# Patient Record
Sex: FEMALE | Race: ASIAN | NOT HISPANIC OR LATINO | URBAN - METROPOLITAN AREA
[De-identification: names, ages, dates, MRNs, and addresses within clinical notes are randomized per-mention and may not be internally consistent; named-entity substitution may affect disease eponyms.]

---

## 2024-08-20 ENCOUNTER — EMERGENCY (EMERGENCY)
Facility: HOSPITAL | Age: 24
LOS: 1 days | Discharge: HOME | End: 2024-08-20
Attending: EMERGENCY MEDICINE
Payer: COMMERCIAL

## 2024-08-20 VITALS
HEART RATE: 80 BPM | OXYGEN SATURATION: 100 % | WEIGHT: 70.11 LBS | DIASTOLIC BLOOD PRESSURE: 80 MMHG | HEIGHT: 59 IN | TEMPERATURE: 99 F | SYSTOLIC BLOOD PRESSURE: 117 MMHG | RESPIRATION RATE: 20 BRPM

## 2024-08-20 LAB
ALBUMIN SERPL ELPH-MCNC: 4.6 G/DL — SIGNIFICANT CHANGE UP (ref 3.3–5)
ALP SERPL-CCNC: 45 U/L — SIGNIFICANT CHANGE UP (ref 40–120)
ALT FLD-CCNC: 10 U/L — SIGNIFICANT CHANGE UP (ref 10–45)
ANION GAP SERPL CALC-SCNC: 13 MMOL/L — SIGNIFICANT CHANGE UP (ref 5–17)
AST SERPL-CCNC: 18 U/L — SIGNIFICANT CHANGE UP (ref 10–40)
BASOPHILS # BLD AUTO: 0.04 K/UL — SIGNIFICANT CHANGE UP (ref 0–0.2)
BASOPHILS NFR BLD AUTO: 0.6 % — SIGNIFICANT CHANGE UP (ref 0–2)
BILIRUB SERPL-MCNC: 0.3 MG/DL — SIGNIFICANT CHANGE UP (ref 0.2–1.2)
BUN SERPL-MCNC: 8 MG/DL — SIGNIFICANT CHANGE UP (ref 7–23)
CALCIUM SERPL-MCNC: 9.4 MG/DL — SIGNIFICANT CHANGE UP (ref 8.4–10.5)
CHLORIDE SERPL-SCNC: 103 MMOL/L — SIGNIFICANT CHANGE UP (ref 96–108)
CO2 SERPL-SCNC: 23 MMOL/L — SIGNIFICANT CHANGE UP (ref 22–31)
CREAT SERPL-MCNC: 0.64 MG/DL — SIGNIFICANT CHANGE UP (ref 0.5–1.3)
EGFR: 126 ML/MIN/1.73M2 — SIGNIFICANT CHANGE UP
EOSINOPHIL # BLD AUTO: 0.15 K/UL — SIGNIFICANT CHANGE UP (ref 0–0.5)
EOSINOPHIL NFR BLD AUTO: 2.3 % — SIGNIFICANT CHANGE UP (ref 0–6)
GLUCOSE SERPL-MCNC: 93 MG/DL — SIGNIFICANT CHANGE UP (ref 70–99)
HCT VFR BLD CALC: 38.7 % — SIGNIFICANT CHANGE UP (ref 34.5–45)
HGB BLD-MCNC: 12.8 G/DL — SIGNIFICANT CHANGE UP (ref 11.5–15.5)
IMM GRANULOCYTES NFR BLD AUTO: 0.5 % — SIGNIFICANT CHANGE UP (ref 0–0.9)
LYMPHOCYTES # BLD AUTO: 2.62 K/UL — SIGNIFICANT CHANGE UP (ref 1–3.3)
LYMPHOCYTES # BLD AUTO: 39.7 % — SIGNIFICANT CHANGE UP (ref 13–44)
MCHC RBC-ENTMCNC: 28.3 PG — SIGNIFICANT CHANGE UP (ref 27–34)
MCHC RBC-ENTMCNC: 33.1 GM/DL — SIGNIFICANT CHANGE UP (ref 32–36)
MCV RBC AUTO: 85.6 FL — SIGNIFICANT CHANGE UP (ref 80–100)
MONOCYTES # BLD AUTO: 0.42 K/UL — SIGNIFICANT CHANGE UP (ref 0–0.9)
MONOCYTES NFR BLD AUTO: 6.4 % — SIGNIFICANT CHANGE UP (ref 2–14)
NEUTROPHILS # BLD AUTO: 3.34 K/UL — SIGNIFICANT CHANGE UP (ref 1.8–7.4)
NEUTROPHILS NFR BLD AUTO: 50.5 % — SIGNIFICANT CHANGE UP (ref 43–77)
NRBC # BLD: 0 /100 WBCS — SIGNIFICANT CHANGE UP (ref 0–0)
PLATELET # BLD AUTO: 268 K/UL — SIGNIFICANT CHANGE UP (ref 150–400)
POTASSIUM SERPL-MCNC: 4.2 MMOL/L — SIGNIFICANT CHANGE UP (ref 3.5–5.3)
POTASSIUM SERPL-SCNC: 4.2 MMOL/L — SIGNIFICANT CHANGE UP (ref 3.5–5.3)
PROT SERPL-MCNC: 7.5 G/DL — SIGNIFICANT CHANGE UP (ref 6–8.3)
RBC # BLD: 4.52 M/UL — SIGNIFICANT CHANGE UP (ref 3.8–5.2)
RBC # FLD: 12.8 % — SIGNIFICANT CHANGE UP (ref 10.3–14.5)
SODIUM SERPL-SCNC: 139 MMOL/L — SIGNIFICANT CHANGE UP (ref 135–145)
WBC # BLD: 6.6 K/UL — SIGNIFICANT CHANGE UP (ref 3.8–10.5)
WBC # FLD AUTO: 6.6 K/UL — SIGNIFICANT CHANGE UP (ref 3.8–10.5)

## 2024-08-20 PROCEDURE — 70450 CT HEAD/BRAIN W/O DYE: CPT | Mod: 26,MC

## 2024-08-20 PROCEDURE — 99285 EMERGENCY DEPT VISIT HI MDM: CPT

## 2024-08-20 NOTE — ED PROVIDER NOTE - ATTENDING APP SHARED VISIT CONTRIBUTION OF CARE
Attending MD Harrison: I personally made/approved the management plan and take responsibility for the patient management.

## 2024-08-20 NOTE — ED PROVIDER NOTE - CLINICAL SUMMARY MEDICAL DECISION MAKING FREE TEXT BOX
Attending MD Harrison:  24-year-old woman with history of migraine headaches presents for evaluation of what started as vision changes in the right eye and left headache pain.  Her symptoms started on 8/15 when she noticed sudden onset of visual loss in the right eye in the lateral visual field 10 minutes later had onset of left-sided sharp headache pain with right-sided hand and face and leg numbness.  Symptoms did imtiaz, she still has intermittent head pain.  Her vision is back to normal she states though she has no symptoms in the face arm or leg anymore.  She was seen by her primary care physician who referred her to evaluation emergency department for rule out TIA.    Patient's vital signs are within normal limits she is sitting comfortably in the stretcher in no apparent distress.  Awake and alert. Affect appropriate. Oriented x 3.  Speech is fluent without dysarthria. Symmetric eyebrow raise, symmetric eyelid closure. PERRL b/l, EOMI b/l, symmetric smile, tongue midline.  5/5  strength bilaterally, 5/5 elbow flexion bilaterally, 5/5 elbow extension bilaterally, 5/5 shoulder shrug b/l.  5/5 plantar and dorsiflexion b/l, 5/5 knee flexion and extension b/l, 5/5 hip flexion and extension b/l. Sensation intact and symmetric grossly to light touch throughout face and bilateral upper and lower extremities,  Finger to nose normal bilaterally. Steady gait.    Patient presenting for transient vision loss right eye left sided headache pain and right-sided facial arm and leg numbness.  Symptoms resolved completely.  Still is intermittently having left-sided headache.  Patient's neurologic exam is without measurable deficit at this time.  Patient underwent noncontrast head CT in triage which was negative for acute pathology.  Overall this would be unlikely to be TIA given patient's age and lack of vascular risk factors and is more likely this is a complex migraine however will obtain the opinion of neurology          *The above represents an initial assessment/impression. Please refer to progress notes for potential changes in patient clinical course*

## 2024-08-20 NOTE — ED ADULT TRIAGE NOTE - CHIEF COMPLAINT QUOTE
Rt side facial numbness vision loss lt sided headache since Thursday symptoms resolved except headache

## 2024-08-20 NOTE — ED ADULT NURSE NOTE - CAS ELECT INFOMATION PROVIDED
opted not to stay for MRI, neuro exam unchanged, male visitor present for transport home/DC instructions

## 2024-08-20 NOTE — ED ADULT NURSE NOTE - NSFALLUNIVINTERV_ED_ALL_ED
Bed/Stretcher in lowest position, wheels locked, appropriate side rails in place/Call bell, personal items and telephone in reach/Instruct patient to call for assistance before getting out of bed/chair/stretcher/Non-slip footwear applied when patient is off stretcher/[place] to call system/Physically safe environment - no spills, clutter or unnecessary equipment/Purposeful proactive rounding/Room/bathroom lighting operational, light cord in reach

## 2024-08-20 NOTE — ED PROVIDER NOTE - OBJECTIVE STATEMENT
24-year-old female PMHx migraines, no daily meds presents to ED complaining of left-sided headache for the last 6 days.  Symptoms started on 8/15 while she was in [place], noticed sudden onset loss of vision in right eye in her lateral visual field, 10 minutes later noted sharp left-sided headache as well as right-sided facial numbness which progressed to right hand numbness and then right lower extremity numbness.  Patient took 1 Advil at that time and then eventually fell asleep still with the symptoms, woke up about 6 hours later the next morning with complete resolution of the right-sided symptoms of numbness and visual disturbance but persistence of the left-sided headache.  Since that day she has had persistent left-sided headache which she describes as "sharp and pinching", intermittent throughout the day w/ associated nausea. No obvious provoking or alleviating factors.  Has not taken anything for the pain since then. She is currently without the headache right now, last felt several hours ago in waiting room.  Went to PMD today and was sent into ED to "rule out TIA".  Sxs do not feel similar to any prior migraine. Denies current headache, visual changes, numbness/tingling, chest pain, shortness of breath, photophobia, vomiting, OCP/exogenous hormone usage, head trauma.

## 2024-08-20 NOTE — ED PROVIDER NOTE - RAPID ASSESSMENT
24-year-old female sent in from her primary office with concern for TIA.  Last Thursday so about 4 - 5 days ago patient had right visual disturbance associated with left-sided headache.  Visual disturbance has since resolved however headache remains.  Patient reports having migraines but never felt like this before in the past.  Light and loud noises made her symptoms worse nothing makes them better.  Patient was rapidly assessed via a telemedicine and/or role of Quick Triage Doctor; a limited history, physical exam and assessment was performed. The patient will be seen and further evaluated in the main emergency department. The remainder of care and evaluation will be conducted by the primary emergency medicine team. Receiving team will follow up on labs, imaging and serially reassess patient as indicated. All further decisions regarding patient care, evaluation and disposition are at the discretion of the receiving primary emergency department team.

## 2024-08-20 NOTE — ED ADULT NURSE NOTE - OBJECTIVE STATEMENT
24yoF PMH Breast cysts presents to ED with headache. Patient was travelling in [place] past 1 week. On Thursday, patient began suddenly having Left sided headache described as "feels like swelling, pressure" that advanced to R-side vision loss, then slurring speech, R hand weakness/paralysis and difficulty walking. After 1 hour of symptoms, patient took advil and fell asleep. Patient woke up 8 hours later asymptomatic. Patient has continued to have intermittent headaches. On evaluation, headache no longer present, occurred as recently as on the way to ED. Patient unable to identify triggers of headaches. 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 Patient had 4 hour flight to [place] each way, normally sedentary at work. At this time, patient unable to work due to discomfort looking at computer screens for extended time. Patient had had cysts removed on both breasts most recently 2017. Patient had another cyst found 3 months ago, no current plans to remove. Patient denies chest pain, SOB, N/V/D/C, fever, chills, urinary symptoms, falls, injuries, numbness, tingling.

## 2024-08-20 NOTE — ED PROVIDER NOTE - PROGRESS NOTE DETAILS
Attending MD Harrison:  Patient signed out to Dr. Rowe Pending results of neurology consultation recommendations.

## 2024-08-21 VITALS
HEART RATE: 92 BPM | RESPIRATION RATE: 18 BRPM | SYSTOLIC BLOOD PRESSURE: 103 MMHG | OXYGEN SATURATION: 100 % | DIASTOLIC BLOOD PRESSURE: 71 MMHG | TEMPERATURE: 98 F

## 2024-08-21 LAB — HCG SERPL-ACNC: <2 MIU/ML — SIGNIFICANT CHANGE UP

## 2024-08-21 PROCEDURE — 99285 EMERGENCY DEPT VISIT HI MDM: CPT | Mod: GC

## 2024-08-21 PROCEDURE — 85025 COMPLETE CBC W/AUTO DIFF WBC: CPT

## 2024-08-21 PROCEDURE — 99236 HOSP IP/OBS SAME DATE HI 85: CPT

## 2024-08-21 PROCEDURE — 80053 COMPREHEN METABOLIC PANEL: CPT

## 2024-08-21 PROCEDURE — 70496 CT ANGIOGRAPHY HEAD: CPT | Mod: MC

## 2024-08-21 PROCEDURE — 70498 CT ANGIOGRAPHY NECK: CPT | Mod: MC

## 2024-08-21 PROCEDURE — G0378: CPT

## 2024-08-21 PROCEDURE — 70450 CT HEAD/BRAIN W/O DYE: CPT | Mod: MC

## 2024-08-21 PROCEDURE — 93005 ELECTROCARDIOGRAM TRACING: CPT

## 2024-08-21 PROCEDURE — 84702 CHORIONIC GONADOTROPIN TEST: CPT

## 2024-08-21 PROCEDURE — 70498 CT ANGIOGRAPHY NECK: CPT | Mod: 26,MC

## 2024-08-21 PROCEDURE — 70496 CT ANGIOGRAPHY HEAD: CPT | Mod: 26,MC

## 2024-08-21 PROCEDURE — 99285 EMERGENCY DEPT VISIT HI MDM: CPT | Mod: 25

## 2024-08-21 NOTE — ED CDU PROVIDER DISPOSITION NOTE - PATIENT PORTAL LINK FT
You can access the FollowMyHealth Patient Portal offered by [facility] by registering at the following website: [email or web address]. By joining [facility]’s FollowMyHealth portal, you will also be able to view your health information using other applications (apps) compatible with our system.

## 2024-08-21 NOTE — ED CDU PROVIDER DISPOSITION NOTE - NSFOLLOWUPINSTRUCTIONS_ED_ALL_ED_FT
Headache    A headache is pain or discomfort felt around the head or neck area. The specific cause of a headache may not be found as there are many types including tension headaches, migraine headaches, and cluster headaches. Watch your condition for any changes. Things you can do to manage your pain include taking over the counter and prescription medications as instructed by your health care provider, lying down in a dark quiet room, limiting stress, getting regular sleep, and refraining from alcohol and tobacco products.    SEEK IMMEDIATE MEDICAL CARE IF YOU HAVE ANY OF THE FOLLOWING SYMPTOMS: fever, vomiting, stiff neck, loss of vision, problems with speech, muscle weakness, loss of balance, trouble walking, passing out, or confusion.    Neurology follow up   [address] [facility]  [phone]      James Benedict  Neurology  [address] [place] [address]  Phone: [phone]  Fax: [phone]

## 2024-08-21 NOTE — ED ADULT NURSE REASSESSMENT NOTE - NS ED NURSE REASSESS COMMENT FT1
Report received from MICHAEL HULL  Pt AAOx4, NAD, resp nonlabored, skin warm/dry, resting comfortably in bed with family . Pt denies headache, dizziness, chest pain, palpitations, SOB, abd pain, n/v/d, urinary symptoms, fevers, chills, weakness at this time. Pt awaiting for MRI  . Safety maintained with call bell within reach.
Report taken from SHILPA Moctezuma. Pt introduced to oncoming RN and updated on plan of care. A&Ox4, breathing unlabored, abd soft nontender nondistended, skin warm dry and intact, ambulatory independently. Call bell in reach, pt educated on use. Bed locked and in lowest position. Denies any other needs or complaints at this time.

## 2024-08-21 NOTE — CONSULT NOTE ADULT - SUBJECTIVE AND OBJECTIVE BOX
Neurology - Consult Note    -  Spectra: [phone] ([facility]), [phone] ([facility])  -    HPI: Patient MADELEINE ARANGO is a 24y (2000)  24 with PMH of migraine presenting for right sided lateral visual field loss on Thursday and then had right hand face and lower extremity numbness. Per history from patient: patient experienced sudden onset central scotoma Thursday (as if the sun in her central vision) this was associated with right sided peripheral vision loss. This resolved and patient subsequently experienced headache 10 minutes after affecting the left occipital area. She felt this headache as stabbing 10/10 in intensity. The headache was associated with nausea, photophobia and phonophobia. She then felt numbness at the left of her lips on the right and right side of her tongue. This progressively spread over the course of a minute to involve the right fingers all the way to the shoulder and down her body to her toes. She denies any precipitating factors or positional change of symptoms. She slept after taking tylenol and woke up the next day without symptoms except for a sensation that her head is swollen on the left side but this resolved over the course of a day and a half and was not bothersome. She visited her PCP and advised her to visit the ED. She denies infection or trauma. She has migraine headaches that happen once monthly and would be associated with nausea, photophobia and phonophobia but never as strong as this one or with aura. At this moment patient does not have any headache.    Review of Systems:    NEUROLOGICAL: +As stated in HPI above  All other review of systems is negative unless indicated above.    Allergies:  No Known Allergies  PMHx/PSHx/Family Hx: As above, otherwise see below     Social Hx:  No current use of tobacco, alcohol, or illicit drugs      Medications:  MEDICATIONS  (STANDING):    MEDICATIONS  (PRN):    Vitals:  T(C): 36.7 (08-21-24 @ 04:58), Max: 37 (08-20-24 @ 15:06)  HR: 73 (08-21-24 @ 04:58) (73 - 82)  BP: 117/84 (08-21-24 @ 04:58) (116/83 - 117/84)  RR: 16 (08-21-24 @ 04:58) (16 - 20)  SpO2: 99% (08-21-24 @ 04:58) (99% - 100%)    Physical Examination:  General - NAD    Neurologic Exam:  Mental status - Awake, Alert, Oriented to person, place, and time. Speech fluent, repetition and naming intact. Follows simple and complex commands. Attention/concentration, recent and remote memory (including registration and recall), and fund of knowledge intact    Cranial nerves - PERRLA, VFF, EOMI, face sensation (V1-V3) intact b/l, facial strength intact without asymmetry b/l, hearing intact b/l, palate with symmetric elevation, trapezius OR sternocleidomastoid 5/5 strength b/l, tongue midline on protrusion with full lateral movement    Motor - Normal bulk and tone throughout. No pronator drift.  Strength testing            Deltoid      Biceps      Triceps                 R            5                 5               5                     5                             L             5                 5               5                     5                                            Hip Flexion    Hip Extension    Knee Flexion    Knee Extension    Dorsiflexion    Plantar Flexion  R              5                           5                       5                           5                            5                          5  L              5                           5                        5                           5                            5                          5    Sensation - there is decreased sensation right UE and LE compared to left to pinprick and light touch    DTR's -             Biceps      Triceps     Brachioradialis      Patellar    Ankle    Toes/plantar response  R             2+             2+                  2+                       3+            2+                 Down  L              2+             2+                 2+                        3+           2+                 Down    Coordination - Finger to Nose intact b/l. No tremors appreciated. HTS intact bilaterally    Gait and station - Normal casual gait. Romberg (-)    Labs:                        12.8   6.60  )-----------( 268      ( 20 Aug 2024 15:39 )             38.7     08-20    139  |  103  |  8   ----------------------------<  93  4.2   |  23  |  0.64    Ca    9.4      20 Aug 2024 15:39    TPro  7.5  /  Alb  4.6  /  TBili  0.3  /  DBili  x   /  AST  18  /  ALT  10  /  AlkPhos  45  08-20    CAPILLARY BLOOD GLUCOSE        LIVER FUNCTIONS - ( 20 Aug 2024 15:39 )  Alb: 4.6 g/dL / Pro: 7.5 g/dL / ALK PHOS: 45 U/L / ALT: 10 U/L / AST: 18 U/L / GGT: x               CSF:                  Radiology:  CT Head No Cont:  (20 Aug 2024 19:19)    < from: CT Head No Cont (08.20.24 @ 19:19) >    IMPRESSION:    No acute intracranial bleeding.    < end of copied text >    
Neurology - Consult Note    -  Spectra: [phone] ([facility]), [phone] ([facility])  -    consult reason: R side numbness and R eye vision loss    HPI: Patient MADELEINE ARANGO is a 24y (2000) woman with a PMHx significant for ***      Review of Systems:  INCOMPLETE   CONSTITUTIONAL: No fevers or chills  EYES AND ENT: No visual changes or no throat pain   NECK: No pain or stiffness  RESPIRATORY: No hemoptysis or shortness of breath  CARDIOVASCULAR: No chest pain or palpitations  GASTROINTESTINAL: No melena or hematochezia  GENITOURINARY: No dysuria or hematuria  NEUROLOGICAL: +As stated in HPI above  SKIN: No itching, burning, rashes, or lesions   All other review of systems is negative unless indicated above.    Allergies:  No Known Allergies      PMHx/PSHx/Family Hx: As above, otherwise see below       Social Hx:  No current use of tobacco, alcohol, or illicit drugs  Lives with ***    Medications:  MEDICATIONS  (STANDING):    MEDICATIONS  (PRN):      Vitals:  T(C): 36.6 (08-21-24 @ 07:25), Max: 37 (08-20-24 @ 15:06)  HR: 73 (08-21-24 @ 07:25) (73 - 82)  BP: 96/61 (08-21-24 @ 07:25) (96/61 - 117/84)  RR: 16 (08-21-24 @ 07:25) (16 - 20)  SpO2: 99% (08-21-24 @ 07:25) (99% - 100%)    Physical Examination: INCOMPLETE  General - NAD  Cardiovascular - Peripheral pulses palpable, no edema  Eyes - Fundoscopy with flat, sharp optic discs and no hemorrhage or exudates; Fundoscopy not well visualized; Fundoscopy not performed due to safety precautions in the setting of the COVID-19 pandemic    Neurologic Exam:  Mental status - Awake, Alert, Oriented to person, place, and time. Speech fluent, repetition and naming intact. Follows simple and complex commands. Attention/concentration, recent and remote memory (including registration and recall), and fund of knowledge intact    Cranial nerves - PERRLA, VFF, EOMI, face sensation (V1-V3) intact b/l, facial strength intact without asymmetry b/l, hearing intact b/l, palate with symmetric elevation, trapezius OR sternocleidomastiod 5/5 strength b/l, tongue midline on protrusion with full lateral movement    Motor - Normal bulk and tone throughout. No pronator drift.  Strength testing            Deltoid      Biceps      Triceps     Wrist Extension    Wrist Flexion     Interossei         R            5                 5               5                     5                              5                        5                 5  L             5                 5               5                     5                              5                        5                 5              Hip Flexion    Hip Extension    Knee Flexion    Knee Extension    Dorsiflexion    Plantar Flexion  R              5                           5                       5                           5                            5                          5  L              5                           5                        5                           5                            5                          5    Sensation - Light touch/temperature OR pain/vibration intact throughout    DTR's -             Biceps      Triceps     Brachioradialis      Patellar    Ankle    Toes/plantar response  R             2+             2+                  2+                       2+            2+                 Down  L              2+             2+                 2+                        2+           2+                 Down    Coordination - Finger to Nose intact b/l. No tremors appreciated    Gait and station - Normal casual gait. Romberg (-)    Labs:                        12.8   6.60  )-----------( 268      ( 20 Aug 2024 15:39 )             38.7     08-20    139  |  103  |  8   ----------------------------<  93  4.2   |  23  |  0.64    Ca    9.4      20 Aug 2024 15:39    TPro  7.5  /  Alb  4.6  /  TBili  0.3  /  DBili  x   /  AST  18  /  ALT  10  /  AlkPhos  45  08-20    CAPILLARY BLOOD GLUCOSE        LIVER FUNCTIONS - ( 20 Aug 2024 15:39 )  Alb: 4.6 g/dL / Pro: 7.5 g/dL / ALK PHOS: 45 U/L / ALT: 10 U/L / AST: 18 U/L / GGT: x               CSF:                  Radiology:  CT Head No Cont:  (20 Aug 2024 19:19)

## 2024-08-21 NOTE — CONSULT NOTE ADULT - ATTENDING COMMENTS
HPI as per resident note, personally verified by me. Patient with history of probable migraine headaches for the past 10 years. She was vacationing in the [place] this past week when on 8/15 she developed vision loss with RHH as well as numbness of right hemibody (face, hand, and leg). These symptoms resolved and patient subsequently developed left posterior headache that was described as "stabbing" and reached maximal intensity of 10/10 in severity over several minutes along with difficulty speaking. She went to sleep and this resolved. She has had headaches before and associated with dizziness but never with vision changes or other sensory changes. She called her PCP when she returned to the [place] and was told to go to the ED due to their concern for TIA. No other focal neurologic deficits or abnormal movements noted.    Neurologic exam as per resident note with additions as below:  AAO x3, speech fluent  CN's II-XII intact  Strength 5/5 all  Sens intact all  FtN intact b/l  Downgoing b/l plantar response    BHcG (-)    < from: CT Angio Head w/ IV Cont (08.21.24 @ 06:31) >    VENOUS STRUCTURES: Visualized superficial and deep venous structures   appear patent.    MISCELLANEOUS: No other vascular abnormality is seen.      IMPRESSION:    CT HEAD:  No CT evidence of intracranial pathology.  Follow-up MRI may be obtained   to exclude a small infarct that is not detectable by CT technique.    CTA NECK:  The cervical vasculature is patent without evidence of atherosclerosis,   stenosis or dissection.    CTA HEAD:  No major vessel occlusion or aneurysm is identified about the [place]   [place].    There is mild stenosis in the pre cavernous segments of both internal   carotid arteries, right greater than left, and there is a mild stenosis   in the proximal supraclinoid segment of the left internal carotid artery.    A punctate calcification at site of stenosis in the supraclinoid left   ICA raises the possibility of atherosclerotic disease, despite the   patient's young age.  An underlying vasculitis or other vasculopathy   should be excluded clinically.    < end of copied text >      A/P:  Vision loss  Skin sensation disturbance  Speech disturbance  Headache    - Etiology for above symptoms seems most consistent with migraine headache with associated neurologic phenomena but cannot entirely exclude intracranial event such as cerebrovascular, infectious, or inflammatory. She has never had symptoms like this before and all resolved now. CT head and CTA head/neck, personally reviewed by me, with mild stenosis of b/l ICA's but no acute intracranial findings, other focal stenosis, occlusion, aneurysm, dissection, or venous sinus thrombosis  - MRI brain and orbits w/ and w/o to assess for above  - Migraine cocktail if headache returns  - Above recommendations discussed with ED team, who verbalized agreement and understanding  - Continue to address above medical problems, as you are doing  - Will continue to follow patient with you

## 2024-08-21 NOTE — CONSULT NOTE ADULT - ASSESSMENT
24 with PMH of migraine presenting for right sided lateral visual field loss on Thursday and then had right hand face and lower extremity numbness. Per history from patient: patient experienced sudden onset central scotoma Thursday (as if the sun in her central vision) this was associated with right sided peripheral vision loss. This resolved and patient subsequently experienced headache 10 minutes after affecting the left occipital area. She felt this headache as stabbing 10/10 in intensity. Exam is notable for right sided sensory loss.    impression: rule out migraine with aura but requires further evaluation given persistent sensory loss    plan:  [] CDU   [] MRI brain with and without  [] MRV   [] pain management if headache recurs.

## 2024-08-21 NOTE — CHART NOTE - NSCHARTNOTEFT_GEN_A_CORE
Notified by ED staff that patient wants to go home and follow up outpatient for MRI. Patient remains without complaints.    Patient is able to go home with outpatient follow up to [facility] clinic or Dr. James Benedict ([phone])

## 2024-08-21 NOTE — ED CDU PROVIDER INITIAL DAY NOTE - OBJECTIVE STATEMENT
24yof no pmhx here with left-sided headache for the last 6 days.  Symptoms started on 8/15 while she was in [place], noticed sudden onset loss of vision in right eye in her lateral visual field, 10 minutes later noted sharp left-sided headache as well as right-sided facial numbness which progressed to right hand numbness and then right lower extremity numbness.  Patient took 1 Advil at that time and then eventually fell asleep still with the symptoms, woke up about 6 hours later the next morning with complete resolution of the right-sided symptoms of numbness and visual disturbance but persistence of the left-sided headache.  Since that day she has had persistent left-sided headache which she describes as "sharp and pinching", intermittent throughout the day w/ associated nausea. No obvious provoking or alleviating factors.  Denies current headache, visual changes, numbness/tingling, chest pain, shortness of breath, photophobia, vomiting, OCP/exogenous hormone usage, head trauma.

## 2024-08-21 NOTE — ED CDU PROVIDER INITIAL DAY NOTE - CLINICAL SUMMARY MEDICAL DECISION MAKING FREE TEXT BOX
Attending Masom:  23 y/o f hx vision loss that has resolved, seen in ed w/ no neuro deficits, imaging in ed neg, seen by ns recommending cdu, neuro checks mri

## 2024-08-21 NOTE — ED CDU PROVIDER DISPOSITION NOTE - CARE PROVIDER_API CALL
James Benedict  Neurology  [address] [place] [address]  Phone: [phone]  Fax: [phone]  Follow Up Time:

## 2024-08-21 NOTE — ED CDU PROVIDER DISPOSITION NOTE - ATTENDING APP SHARED VISIT CONTRIBUTION OF CARE
Dante Briones MD: 24-year-old female with no PMH who was placed in the CDU for workup of headache with plan for neurochecks, MRI, neurology recommendations.  While patient was in the CDU, patient had resolution of symptoms, and did not want to wait to obtain the MRI in the CDU, and states that she will get it done as outpatient.  Neurology had seen patient and deemed appropriate to have outpatient follow-up and outpatient MRI, no indication for further observation or for admission.  On examination, patient is ANO x 3, demonstrates capacity to make medical decisions.  Neurologically intact.  Stable for discharge with close follow-up and strict return precautions. Discussed the indications and side-effects of applicable medications.  Informed patient of all diagnostic test results including labs and imaging. The patient has been informed of all concerning signs and symptoms to return to Emergency Department, the necessity to follow up with PMD and neurology within 1-2 days and to obtain MRI on expedited basis within the next 2 to 3 days was explained, or to return to the ED if unable to follow-up appropriately, and the patient reports understanding of above with capacity and insight.

## 2024-08-21 NOTE — ED CDU PROVIDER INITIAL DAY NOTE - PROGRESS NOTE DETAILS
Pt resting comfortably. NAD. No complaints. VSS. feeling much better. No headache. Pt seen and evaluated by Neuro team, recommend MRI. pt does not want to wait for MRI, wants to have it done outpt with Neuro. Neuro team ok with outpt followup. Case discussed with Dr. Briones. pt ambulatory in the unit. No neuro deficits. strict return precautions advised. stable for discharge -SANDRA Hodgson